# Patient Record
Sex: FEMALE | Race: WHITE | NOT HISPANIC OR LATINO | Employment: FULL TIME | URBAN - NONMETROPOLITAN AREA
[De-identification: names, ages, dates, MRNs, and addresses within clinical notes are randomized per-mention and may not be internally consistent; named-entity substitution may affect disease eponyms.]

---

## 2023-10-26 ENCOUNTER — HOSPITAL ENCOUNTER (OUTPATIENT)
Dept: MAMMOGRAPHY | Facility: HOSPITAL | Age: 48
Discharge: HOME | End: 2023-10-26
Admitting: OBSTETRICS & GYNECOLOGY
Payer: COMMERCIAL

## 2023-10-26 DIAGNOSIS — Z12.31 BREAST CANCER SCREENING BY MAMMOGRAM: ICD-10-CM

## 2023-10-26 PROCEDURE — 77063 BREAST TOMOSYNTHESIS BI: CPT

## 2023-10-26 PROCEDURE — 77067 SCR MAMMO BI INCL CAD: CPT

## 2023-11-06 DIAGNOSIS — R92.8 ABNORMAL MAMMOGRAM: Primary | ICD-10-CM

## 2023-12-27 ENCOUNTER — HOSPITAL ENCOUNTER (OUTPATIENT)
Dept: ULTRASOUND IMAGING | Facility: HOSPITAL | Age: 48
Discharge: HOME | End: 2023-12-27
Payer: COMMERCIAL

## 2023-12-27 ENCOUNTER — HOSPITAL ENCOUNTER (OUTPATIENT)
Dept: MAMMOGRAPHY | Facility: HOSPITAL | Age: 48
Discharge: HOME | End: 2023-12-27
Payer: COMMERCIAL

## 2023-12-27 DIAGNOSIS — R92.8 ABNORMAL MAMMOGRAM: ICD-10-CM

## 2023-12-27 PROCEDURE — 76642 ULTRASOUND BREAST LIMITED: CPT

## 2023-12-27 PROCEDURE — G0279 TOMOSYNTHESIS, MAMMO: HCPCS

## 2023-12-27 PROCEDURE — 77065 DX MAMMO INCL CAD UNI: CPT

## 2024-07-08 ENCOUNTER — OFFICE VISIT (OUTPATIENT)
Dept: OBSTETRICS AND GYNECOLOGY | Facility: CLINIC | Age: 49
End: 2024-07-08
Payer: COMMERCIAL

## 2024-07-08 VITALS
WEIGHT: 129.4 LBS | HEIGHT: 64 IN | BODY MASS INDEX: 22.09 KG/M2 | DIASTOLIC BLOOD PRESSURE: 66 MMHG | SYSTOLIC BLOOD PRESSURE: 118 MMHG

## 2024-07-08 DIAGNOSIS — T45.2X1S VITAMIN D TOXICITY, ACCIDENTAL OR UNINTENTIONAL, SEQUELA: ICD-10-CM

## 2024-07-08 DIAGNOSIS — N91.2 AMENORRHEA: ICD-10-CM

## 2024-07-08 DIAGNOSIS — R53.83 OTHER FATIGUE: ICD-10-CM

## 2024-07-08 DIAGNOSIS — Z12.31 BREAST CANCER SCREENING BY MAMMOGRAM: ICD-10-CM

## 2024-07-08 DIAGNOSIS — Z13.29 THYROID DISORDER SCREEN: ICD-10-CM

## 2024-07-08 DIAGNOSIS — R87.610 ASCUS OF CERVIX WITH NEGATIVE HIGH RISK HPV: ICD-10-CM

## 2024-07-08 DIAGNOSIS — Z01.419 ENCOUNTER FOR GYNECOLOGICAL EXAMINATION WITHOUT ABNORMAL FINDING: Primary | ICD-10-CM

## 2024-07-08 NOTE — PROGRESS NOTES
Gynecologic Annual Exam Note          CC - Here for annual exam.     Subjective     HPI  Franci Keith is a 49 y.o. female, , who presents for annual well woman exam.  She is perimenopausal. Periods are  absent, secondary to her Mirena .   Patient reports problems with:  a decrease in energy .  Partner Status: Marital Status: .  New Partners since last visit: no.  The patient Denies vasomotor symptoms.     The patient has complaints today.  She would like to to discuss issues with increased fatigue.    She exercises regularly: no.  She has concerns about domestic violence: no.      Additional OB/GYN History   Current contraception: contraceptive methods: Vasectomy and IUD.  Insertion date: Mirena 2020  Desires to: continue contraception  History of abnormal Pap smear: no  Family history of uterine, colon, breast, or ovarian cancer: no  Performs monthly Self-Breast Exam: no  Feelings of Anxiety or Depression: no    Last Pap : 7/3/2023- ASCUS, HPV negative   Last Completed Pap Smear            Ordered - PAP SMEAR (Every 3 Years) Ordered on 2023  LIQUID-BASED PAP SMEAR WITH HPV GENOTYPING REGARDLESS OF INTERPRETATION ([facility])                    Last mammogram: 2023  Last Completed Mammogram            Ordered - MAMMOGRAM (Every 2 Years) Ordered on 2023  Mammo Diagnostic Digital Tomosynthesis Right With CAD    10/26/2023  SCANNED - MAMMO    10/26/2023  Mammo Screening Digital Tomosynthesis Bilateral With CAD    2022  SCANNED - MAMMO    2021  SCANNED - MAMMO    Only the first 5 history entries have been loaded, but more history exists.                    Last colonoscopy: - negative   Last Completed Colonoscopy       This patient has no relevant Health Maintenance data.              Tobacco Usage?: No   OB History          2    Para   2    Term   2            AB        Living             SAB        IAB         "Ectopic        Molar        Multiple        Live Births                      The additional following portions of the patient's history were reviewed and updated as appropriate: allergies, current medications, past family history, past medical history, past social history, past surgical history, and problem list.    Past Medical History:   Diagnosis Date    Anxiety     Depression         Past Surgical History:   Procedure Laterality Date    HEMORRHOIDECTOMY      INTRAUTERINE DEVICE INSERTION  01/16/2020    [facility]        Review of Systems   Constitutional:  Positive for fatigue.   HENT: Negative.     Eyes: Negative.    Respiratory: Negative.     Cardiovascular: Negative.    Gastrointestinal: Negative.    Endocrine: Negative.    Genitourinary: Negative.    Musculoskeletal: Negative.    Skin: Negative.    Allergic/Immunologic: Negative.    Neurological: Negative.    Hematological: Negative.    Psychiatric/Behavioral: Negative.         I have reviewed and agree with the HPI, ROS, and historical information as entered above. Tsering Villagomez MD    Objective   /66   Ht 162.6 cm (64\")   Wt 58.7 kg (129 lb 6.4 oz)   BMI 22.21 kg/m²     Physical Exam  Vitals and nursing note reviewed. Exam conducted with a chaperone present.   Constitutional:       General: She is awake.   HENT:      Head: Normocephalic and atraumatic.   Neck:      Thyroid: No thyroid mass, thyromegaly or thyroid tenderness.   Cardiovascular:      Rate and Rhythm: Normal rate.      Heart sounds: No murmur heard.     No friction rub. No gallop.   Pulmonary:      Effort: Pulmonary effort is normal.      Breath sounds: Normal breath sounds. No stridor. No wheezing, rhonchi or rales.   Chest:      Chest wall: No mass or tenderness.   Breasts:     Right: Normal. No bleeding, inverted nipple, mass, nipple discharge, skin change or tenderness.      Left: Normal. No bleeding, inverted nipple, mass, nipple discharge, skin change or tenderness.   Abdominal:     "  Palpations: Abdomen is soft.      Tenderness: There is no guarding or rebound.      Hernia: There is no hernia in the left inguinal area or right inguinal area.   Genitourinary:     General: Normal vulva.      Pubic Area: No rash.       Labia:         Right: No rash, tenderness, lesion or injury.         Left: No rash, tenderness, lesion or injury.       Urethra: No prolapse, urethral swelling or urethral lesion.      Vagina: No foreign body. No vaginal discharge, erythema, tenderness, bleeding or lesions.      Cervix: No cervical motion tenderness, discharge, friability, lesion, erythema or cervical bleeding.      Uterus: Normal. Not deviated, not enlarged, not fixed and not tender.       Adnexa: Right adnexa normal and left adnexa normal.        Right: No mass, tenderness or fullness.          Left: No mass, tenderness or fullness.        Rectum: No external hemorrhoid.   Musculoskeletal:      Cervical back: Normal range of motion.   Lymphadenopathy:      Upper Body:      Right upper body: No supraclavicular or axillary adenopathy.      Left upper body: No supraclavicular or axillary adenopathy.   Skin:     General: Skin is warm.   Neurological:      Mental Status: She is alert and oriented to person, place, and time.   Psychiatric:         Mood and Affect: Mood and affect normal.         Speech: Speech normal.         Assessment & Plan     Assessment     Problem List Items Addressed This Visit    None  Visit Diagnoses       Encounter for gynecological examination without abnormal finding    -  Primary    Relevant Orders    LIQUID-BASED PAP SMEAR WITH HPV GENOTYPING IF ASCUS ([facility])    CBC & Differential    Comprehensive Metabolic Panel    Breast cancer screening by mammogram        Relevant Orders    Mammo Screening Digital Tomosynthesis Bilateral With CAD    Other fatigue        Relevant Orders    Vitamin B12    Folate    ASCUS of cervix with negative high risk HPV        Relevant Orders    LIQUID-BASED  PAP SMEAR WITH HPV GENOTYPING IF ASCUS ([facility])    Vitamin D toxicity, accidental or unintentional, sequela        Relevant Orders    Vitamin D,25-Hydroxy    Thyroid disorder screen        Relevant Orders    TSH    T4, Free    Amenorrhea        Relevant Orders    Luteinizing Hormone    Follicle Stimulating Hormone    Estradiol            Plan     Reviewed monthly self breast exams.  Instructed to call with lumps, pain, or breast discharge.  Yearly mammograms ordered.  Ordered mammogram today.  Recommended use of Vitamin D and getting adequate calcium in her diet. (1500mg)  RTC in 1 year or PRN with problems.  Doing well with Tammy Villagomez MD  07/08/2024

## 2024-07-09 LAB
25(OH)D3+25(OH)D2 SERPL-MCNC: 54.8 NG/ML (ref 30–100)
ALBUMIN SERPL-MCNC: 4.6 G/DL (ref 3.9–4.9)
ALP SERPL-CCNC: 56 IU/L (ref 44–121)
ALT SERPL-CCNC: 24 IU/L (ref 0–32)
AST SERPL-CCNC: 20 IU/L (ref 0–40)
BASOPHILS # BLD AUTO: 0 X10E3/UL (ref 0–0.2)
BASOPHILS NFR BLD AUTO: 1 %
BILIRUB SERPL-MCNC: 0.5 MG/DL (ref 0–1.2)
BUN SERPL-MCNC: 11 MG/DL (ref 6–24)
BUN/CREAT SERPL: 16 (ref 9–23)
CALCIUM SERPL-MCNC: 9.7 MG/DL (ref 8.7–10.2)
CHLORIDE SERPL-SCNC: 104 MMOL/L (ref 96–106)
CO2 SERPL-SCNC: 24 MMOL/L (ref 20–29)
CREAT SERPL-MCNC: 0.7 MG/DL (ref 0.57–1)
EGFRCR SERPLBLD CKD-EPI 2021: 107 ML/MIN/1.73
EOSINOPHIL # BLD AUTO: 0.1 X10E3/UL (ref 0–0.4)
EOSINOPHIL NFR BLD AUTO: 2 %
ERYTHROCYTE [DISTWIDTH] IN BLOOD BY AUTOMATED COUNT: 12.7 % (ref 11.7–15.4)
ESTRADIOL SERPL-MCNC: 350 PG/ML
FOLATE SERPL-MCNC: 17.2 NG/ML
FSH SERPL-ACNC: 5.1 MIU/ML
GLOBULIN SER CALC-MCNC: 2.4 G/DL (ref 1.5–4.5)
GLUCOSE SERPL-MCNC: 75 MG/DL (ref 70–99)
HCT VFR BLD AUTO: 42.8 % (ref 34–46.6)
HGB BLD-MCNC: 14.4 G/DL (ref 11.1–15.9)
IMM GRANULOCYTES # BLD AUTO: 0 X10E3/UL (ref 0–0.1)
IMM GRANULOCYTES NFR BLD AUTO: 0 %
LH SERPL-ACNC: 7.1 MIU/ML
LYMPHOCYTES # BLD AUTO: 1.8 X10E3/UL (ref 0.7–3.1)
LYMPHOCYTES NFR BLD AUTO: 40 %
MCH RBC QN AUTO: 29.8 PG (ref 26.6–33)
MCHC RBC AUTO-ENTMCNC: 33.6 G/DL (ref 31.5–35.7)
MCV RBC AUTO: 89 FL (ref 79–97)
MONOCYTES # BLD AUTO: 0.3 X10E3/UL (ref 0.1–0.9)
MONOCYTES NFR BLD AUTO: 7 %
NEUTROPHILS # BLD AUTO: 2.3 X10E3/UL (ref 1.4–7)
NEUTROPHILS NFR BLD AUTO: 50 %
PLATELET # BLD AUTO: 236 X10E3/UL (ref 150–450)
POTASSIUM SERPL-SCNC: 4.9 MMOL/L (ref 3.5–5.2)
PROT SERPL-MCNC: 7 G/DL (ref 6–8.5)
RBC # BLD AUTO: 4.83 X10E6/UL (ref 3.77–5.28)
SODIUM SERPL-SCNC: 141 MMOL/L (ref 134–144)
T4 FREE SERPL-MCNC: 1.05 NG/DL (ref 0.82–1.77)
TSH SERPL DL<=0.005 MIU/L-ACNC: 1.07 UIU/ML (ref 0.45–4.5)
VIT B12 SERPL-MCNC: 480 PG/ML (ref 232–1245)
WBC # BLD AUTO: 4.5 X10E3/UL (ref 3.4–10.8)

## 2024-07-09 NOTE — PROGRESS NOTES
Please call patient about results.  Labs all normal, no obvious issue causing fatigue.  Hormone labs normal, not showing menopause yet.

## 2024-07-15 LAB — REF LAB TEST METHOD: NORMAL

## 2025-06-25 ENCOUNTER — HOSPITAL ENCOUNTER (OUTPATIENT)
Dept: MAMMOGRAPHY | Facility: HOSPITAL | Age: 50
Discharge: HOME | End: 2025-06-25
Admitting: OBSTETRICS & GYNECOLOGY
Payer: COMMERCIAL

## 2025-06-25 DIAGNOSIS — Z12.31 BREAST CANCER SCREENING BY MAMMOGRAM: ICD-10-CM

## 2025-06-25 PROCEDURE — 77067 SCR MAMMO BI INCL CAD: CPT

## 2025-06-25 PROCEDURE — 77063 BREAST TOMOSYNTHESIS BI: CPT

## 2025-07-14 ENCOUNTER — OFFICE VISIT (OUTPATIENT)
Dept: OBSTETRICS AND GYNECOLOGY | Facility: CLINIC | Age: 50
End: 2025-07-14
Payer: COMMERCIAL

## 2025-07-14 VITALS
WEIGHT: 136.6 LBS | SYSTOLIC BLOOD PRESSURE: 110 MMHG | DIASTOLIC BLOOD PRESSURE: 70 MMHG | HEIGHT: 64 IN | BODY MASS INDEX: 23.32 KG/M2

## 2025-07-14 DIAGNOSIS — Z01.419 WOMEN'S ANNUAL ROUTINE GYNECOLOGICAL EXAMINATION: Primary | ICD-10-CM

## 2025-07-14 DIAGNOSIS — Z30.431 IUD CHECK UP: ICD-10-CM

## 2025-07-14 PROCEDURE — 99396 PREV VISIT EST AGE 40-64: CPT | Performed by: NURSE PRACTITIONER

## 2025-07-14 NOTE — PROGRESS NOTES
Gynecologic Annual Exam Note          GYN Annual Exam     annual        Subjective     HPI  Franci Keith is a 50 y.o. female, , who presents for annual well woman exam as a established patient. There were no changes to her medical or surgical history since her last visit..   She is perimenopausal. Periods are typically absent, secondary to her Mirena, but occasional spotting every few months.  Marital Status: . She is sexually active. She has not had new partners.. STD testing recommendations have been explained to the patient and she declines STD testing.    The patient would like to discuss the following complaints today: none    Additional OB/GYN History   contraceptive methods: IUD.  Insertion date: 2020- Mirena and Vasectomy   Desires to: continue contraception  History of migraines: yes without aura    Last Pap : 2024. Result: ASCUS. HPV: negative.   Last Completed Pap Smear    This patient has no relevant Health Maintenance data.       History of abnormal Pap smear: yes - 7/3/2023- ascus, hpv negative  Family history of uterine, colon, breast, or ovarian cancer: yes - [facility]- Colon  Performs monthly Self-Breast Exam: no  Last mammogram: 2025. Done at [facility]. There is a copy in the chart.    Last Completed Mammogram            Upcoming       MAMMOGRAM (Every 2 Years) Next due on 2025  Mammo Screening Digital Tomosynthesis Bilateral With CAD    2023  Mammo Diagnostic Digital Tomosynthesis Right With CAD    10/26/2023  Mammo Screening Digital Tomosynthesis Bilateral With CAD    10/26/2023  SCANNED - MAMMO    2022  SCANNED - MAMMO     Only the first 5 history entries have been loaded, but more history exists.                          Colonoscopy: has had a colonoscopy- , negative   Exercises Regularly: yes  Feelings of Anxiety or Depression: yes - managed  Tobacco Usage?: No       Current Outpatient Medications:     citalopram (CeleXA)  "20 MG tablet, Take 1 tablet by mouth Daily., Disp: , Rfl:     Levonorgestrel (Mirena, 52 MG,) 20 MCG/DAY intrauterine device IUD, To be inserted one time by prescriber. Route intrauterine., Disp: , Rfl:     methenamine (HIPREX) 1 g tablet, Take 1 tablet by mouth 2 (Two) Times a Day With Meals., Disp: , Rfl:     Polyethylene Glycol 3350 (MIRALAX PO), Take  by mouth., Disp: , Rfl:      Patient denies the need for medication refills today.    OB History          2    Para   2    Term   2            AB        Living             SAB        IAB        Ectopic        Molar        Multiple        Live Births   2                Past Medical History:   Diagnosis Date    Anxiety     Depression         Past Surgical History:   Procedure Laterality Date    HEMORRHOIDECTOMY      INTRAUTERINE DEVICE INSERTION  2020    [employer]       Health Maintenance   Topic Date Due    HEPATITIS C SCREENING  Never done    ANNUAL PHYSICAL  Never done    COVID-19 Vaccine ( season) 2024    PAP SMEAR  2025    Annual Gynecologic Pelvic and Breast Exam  2025    INFLUENZA VACCINE  10/01/2025    MAMMOGRAM  2027    TDAP/TD VACCINES (2 - Td or Tdap) 2027    COLORECTAL CANCER SCREENING  10/06/2032    Pneumococcal Vaccine 0-49  Aged Out       The additional following portions of the patient's history were reviewed and updated as appropriate: allergies, current medications, past family history, past medical history, past social history, past surgical history, and problem list.    Review of Systems   Constitutional: Negative.    Respiratory: Negative.     Cardiovascular: Negative.    Gastrointestinal: Negative.    Genitourinary: Negative.    Psychiatric/Behavioral: Negative.           I have reviewed and agree with the HPI, ROS, and historical information as entered above. Yara Verde, APRN          Objective   /70   Ht 162.6 cm (64\")   Wt 62 kg (136 lb 9.6 oz)   LMP 07/10/2025 " (Approximate)   BMI 23.45 kg/m²     Physical Exam  Constitutional:       Appearance: Normal appearance.   Neck:      Thyroid: No thyroid mass or thyromegaly.   Pulmonary:      Effort: Pulmonary effort is normal.   Chest:      Chest wall: No mass.   Breasts:     Right: Normal. No inverted nipple, mass, nipple discharge or skin change.      Left: Normal. No inverted nipple, mass, nipple discharge or skin change.   Abdominal:      General: There is no distension.      Palpations: Abdomen is soft. There is no mass.      Tenderness: There is no abdominal tenderness.      Hernia: No hernia is present.   Genitourinary:     General: Normal vulva.      Labia:         Right: No rash.         Left: No rash.       Vagina: Bleeding present.      Cervix: No cervical motion tenderness or lesion.      Uterus: Normal.       Adnexa: Right adnexa normal and left adnexa normal.        Right: No mass or tenderness.          Left: No mass or tenderness.        Comments: Scant brown spotting. IUD strings visible and appropriate  Neurological:      Mental Status: She is alert.            Assessment and Plan    Problem List Items Addressed This Visit          Genitourinary and Reproductive     IUD check up          Overview    Mirena IUD placed 1/16/20       Other Visit Diagnoses         Women's annual routine gynecological examination    -  Primary    Relevant Orders    LIQUID-BASED PAP SMEAR WITH HPV GENOTYPING IF ASCUS ([facility])            GYN annual well woman exam.   Pap guidelines reviewed.  Reviewed monthly self breast exams.  Instructed to call with lumps, pain, or breast discharge.    Ordered Mammogram today  Return in about 1 year (around 7/14/2026) for Annual physical.   Per Dr. Villagomez last note if still ASCUS may want to do colpo. D/W patient    Yara M Ammon, APRN  07/14/2025

## 2025-07-16 LAB — REF LAB TEST METHOD: NORMAL
